# Patient Record
Sex: MALE | Race: AMERICAN INDIAN OR ALASKA NATIVE
[De-identification: names, ages, dates, MRNs, and addresses within clinical notes are randomized per-mention and may not be internally consistent; named-entity substitution may affect disease eponyms.]

---

## 2018-03-01 ENCOUNTER — HOSPITAL ENCOUNTER (EMERGENCY)
Dept: HOSPITAL 56 - MW.ED | Age: 33
Discharge: HOME | End: 2018-03-01
Payer: MEDICAID

## 2018-03-01 VITALS — SYSTOLIC BLOOD PRESSURE: 131 MMHG | DIASTOLIC BLOOD PRESSURE: 97 MMHG

## 2018-03-01 DIAGNOSIS — I10: ICD-10-CM

## 2018-03-01 DIAGNOSIS — L03.311: ICD-10-CM

## 2018-03-01 DIAGNOSIS — L02.211: Primary | ICD-10-CM

## 2018-03-01 DIAGNOSIS — F17.210: ICD-10-CM

## 2018-03-01 NOTE — EDM.PDOC
ED HPI GENERAL MEDICAL PROBLEM





- General


Chief Complaint: Skin Complaint


Stated Complaint: POSSIBLE STAPH INFECTION


Time Seen by Provider: 03/01/18 20:29


Source of Information: Reports: Patient





- History of Present Illness


INITIAL COMMENTS - FREE TEXT/NARRATIVE: 





HISTORY AND PHYSICAL:


History of present illness:


[Patient was in last week with an abscess on his abdomen he has history of 

previous staph abscesses, this abscess is just right of his umbilicus is small 

open lesion with scant exudates healing by secondary intent drained on its own 

last week however he did not fill his antibiotics actually looks to be healing 

well he could definitely use an antibiotic. The drain lesion is approximately 

three quarters of an inch in diameter healing by secondary intent with scant 

exudates red rolled border and small amount of cellulitis surrounding





No fever nausea vomiting chills sweats no chest pain shortness breath headache 

dizziness palpitation about your symptoms





]


Review of systems: 


As per history of present illness and below otherwise all systems reviewed and 

negative.


Past medical history: 


As per history of present illness and as reviewed below otherwise 

noncontributory.


Surgical history: 


As per history of present illness and as reviewed below otherwise 

noncontributory.


Social history: 


No reported history of drug or alcohol abuse.


Family history: 


As per history of present illness and as reviewed below otherwise 

noncontributory.


Physical exam:


HEENT: Atraumatic, normocephalic, pupils reactive, negative for conjunctival 

pallor or scleral icterus, mucous membranes moist, throat clear, neck supple, 

nontender, trachea midline.


Lungs: Clear to auscultation, breath sounds equal bilaterally, chest nontender.


Heart: S1S2, regular, negative for clicks, rubs, or JVD.


Abdomen: Soft, nondistended, nontender. Negative for masses or 

hepatosplenomegaly. Negative for costovertebral tenderness.


Pelvis: Stable nontender.


Genitourinary: Deferred.


Rectal: Deferred.


Extremities: Atraumatic, negative for cords or calf pain. Neurovascular 

unremarkable.


Neuro: Awake, alert, oriented. Cranial nerves II through XII unremarkable. 

Cerebellum unremarkable. Motor and sensory unremarkable throughout. Exam 

nonfocal.


Skin as per history of present illness otherwise unremarkable


Diagnostics:


[Wound culture]


Therapeutics:


[Bactrim double strength by mouth twice a day #20 no refill]


Impression: 


[Abscess with auto drainage


Mild cellulitis]


Definitive disposition and diagnosis as appropriate pending reevaluation and 

review of above.





- Related Data


 Allergies











Allergy/AdvReac Type Severity Reaction Status Date / Time


 


No Known Allergies Allergy   Verified 02/25/18 18:52











Home Meds: 


 Home Meds





. [No Known Home Meds]  08/29/16 [History]











Past Medical History





- Past Health History


Medical/Surgical History: Denies Medical/Surgical History


HEENT History: Reports: None


Cardiovascular History: Reports: Hypertension


Respiratory History: Reports: None


Gastrointestinal History: Reports: None


Genitourinary History: Reports: None


Musculoskeletal History: Reports: None


Neurological History: Reports: None


Psychiatric History: Reports: ADHD


Endocrine/Metabolic History: Reports: None


Hematologic History: Reports: None


Immunologic History: Reports: None


Oncologic (Cancer) History: Reports: None


Dermatologic History: Reports: None





- Infectious Disease History


Infectious Disease History: Reports: Chicken Pox, Hepatitis B, Other (See Below)


Other Infectious Disease History: Staph





- Past Surgical History


Head Surgeries/Procedures: Reports: None


HEENT Surgical History: Reports: None


Cardiovascular Surgical History: Reports: None


Respiratory Surgical History: Reports: None


GI Surgical History: Reports: None


Male  Surgical History: Reports: None


Endocrine Surgical History: Reports: None


Neurological Surgical History: Reports: None


Musculoskeletal Surgical History: Reports: None


Oncologic Surgical History: Reports: None


Dermatological Surgical History: Reports: None





Social & Family History





- Family History


Family Medical History: Noncontributory





- Tobacco Use


Smoking Status *Q: Current Every Day Smoker


Years of Tobacco use: 18


Packs/Tins Daily: 0.5


Second Hand Smoke Exposure: Yes





- Caffeine Use


Caffeine Use: Reports: Coffee





- Recreational Drug Use


Recreational Drug Use: No


Drug Use in Last 12 Months: Yes


Recreational Drug Type: Reports: Marijuana/Hashish


Recreational Drug Use Frequency: Rarely





ED ROS GENERAL





- Review of Systems


Review Of Systems: See Below





ED EXAM, SKIN/RASH


Exam: See Below





Course





- Orders/Labs/Meds


Orders: 





 Active Orders 24 hr











 Category Date Time Status


 


 CULTURE WOUND [RM] Stat Lab  03/01/18 20:28 Ordered














Departure





- Departure


Time of Disposition: 20:31


Disposition: Home, Self-Care 01


Condition: Good


Clinical Impression: 


 Cellulitis, Abscess








- Discharge Information


Referrals: 


PCP,None [Primary Care Provider] - 


Additional Instructions: 


Follow-up with primary care in one to 2 weeks


Return if symptoms persist or worsen


Culture is pending





The following information is given to patients seen in the emergency department 

who are being discharged to home. This information is to outline your options 

for follow-up care. We provide all patients seen in our emergency department 

with a follow-up referral.





The need for follow-up, as well as the timing and circumstances, are variable 

depending upon the specifics of your emergency department visit.





If you don't have a primary care physician on staff, we will provide you with a 

referral. We always advise you to contact your personal physician following an 

emergency department visit to inform them of the circumstance of the visit and 

for follow-up with them and/or the need for any referrals to a consulting 

specialist.





The emergency department will also refer you to a specialist when appropriate. 

This referral assures that you have the opportunity for follow-up care with a 

specialist. All of these measure are taken in an effort to provide you with 

optimal care, which includes your follow-up.





Under all circumstances we always encourage you to contact your private 

physician who remains a resource for coordinating your care. When calling for 

follow-up care, please make the office aware that this follow-up is from your 

recent emergency room visit. If for any reason you are refused follow-up, 

please contact the [facility] emergency department at [phone] 

and asked to speak to the emergency department charge nurse.








- My Orders


Last 24 Hours: 





My Active Orders





03/01/18 20:28


CULTURE WOUND [RM] Stat 














- Assessment/Plan


Last 24 Hours: 





My Active Orders





03/01/18 20:28


CULTURE WOUND [RM] Stat

## 2018-03-30 ENCOUNTER — HOSPITAL ENCOUNTER (EMERGENCY)
Dept: HOSPITAL 56 - MW.ED | Age: 33
Discharge: HOME | End: 2018-03-30
Payer: COMMERCIAL

## 2018-03-30 VITALS — DIASTOLIC BLOOD PRESSURE: 86 MMHG | SYSTOLIC BLOOD PRESSURE: 152 MMHG

## 2018-03-30 DIAGNOSIS — I10: ICD-10-CM

## 2018-03-30 DIAGNOSIS — F17.210: ICD-10-CM

## 2018-03-30 DIAGNOSIS — L02.211: Primary | ICD-10-CM

## 2018-03-30 PROCEDURE — 99283 EMERGENCY DEPT VISIT LOW MDM: CPT

## 2018-03-30 PROCEDURE — 87186 SC STD MICRODIL/AGAR DIL: CPT

## 2018-03-30 PROCEDURE — 96372 THER/PROPH/DIAG INJ SC/IM: CPT

## 2018-03-30 PROCEDURE — 87077 CULTURE AEROBIC IDENTIFY: CPT

## 2018-03-30 PROCEDURE — 87070 CULTURE OTHR SPECIMN AEROBIC: CPT

## 2018-03-30 PROCEDURE — 85025 COMPLETE CBC W/AUTO DIFF WBC: CPT

## 2018-03-30 NOTE — EDM.PDOC
ED HPI GENERAL MEDICAL PROBLEM





- General


Chief Complaint: Skin Complaint


Stated Complaint: LEFT LOWER ABDOMINAL PAIN


Time Seen by Provider: 03/30/18 09:45


Source of Information: Reports: Patient





- History of Present Illness


INITIAL COMMENTS - FREE TEXT/NARRATIVE: 





HISTORY AND PHYSICAL:


History of present illness:


[Patient presents with a superficial skin abscess on the left lower quadrant 

has history of MRSA infection in the past no fever nausea vomiting chills 

sweats he rates pain 6 out of 10 nonradiating associated with the lesion it is 

red indurated 1-1/2 inches in diameter centrally there is a fluctuant area with 

what appears to be a small amount of superficial exudate]








Review of systems: 


As per history of present illness and below otherwise all systems reviewed and 

negative.


Past medical history: 


As per history of present illness and as reviewed below otherwise 

noncontributory.


Surgical history: 


As per history of present illness and as reviewed below otherwise 

noncontributory.


Social history: 


No reported history of drug or alcohol abuse.


Family history: 


As per history of present illness and as reviewed below otherwise 

noncontributory.








Physical exam:


HEENT: Atraumatic, normocephalic, pupils reactive, negative for conjunctival 

pallor or scleral icterus, mucous membranes moist, throat clear, neck supple, 

nontender, trachea midline.


Lungs: Clear to auscultation, breath sounds equal bilaterally, chest nontender.


Heart: S1S2, regular, negative for clicks, rubs, or JVD.


Abdomen: Soft, nondistended, nontender. Negative for masses or 

hepatosplenomegaly. Negative for costovertebral tenderness.


Pelvis: Stable nontender.


Genitourinary: Deferred.


Rectal: Deferred.


Extremities: Atraumatic, negative for cords or calf pain. Neurovascular 

unremarkable.


Neuro: Awake, alert, oriented. Cranial nerves II through XII unremarkable. 

Cerebellum unremarkable. Motor and sensory unremarkable throughout. Exam 

nonfocal.


Skin as per history of present illness








Diagnostics:


[CBC


Wound culture


]


Therapeutics:


[1 g Rocephin IM


I&D performed-2-3 tablespoons of drainage were expressed, patient refused 

iodoform packing


Patient agrees to warm compress to promote drainage


Return if symptoms persist or worsen


Follow-up with primary careIn one week 





Bactrim double strength by mouth twice a day #20 no refill


]


Impression: 


[ abscess left lower quadrant ]


Definitive disposition and diagnosis as appropriate pending reevaluation and 

review of above.


  ** Left Lower Abdomen


Pain Score (Numeric/FACES): 10





- Related Data


 Allergies











Allergy/AdvReac Type Severity Reaction Status Date / Time


 


No Known Allergies Allergy   Verified 03/30/18 09:41











Home Meds: 


 Home Meds





. [No Known Home Meds]  08/29/16 [History]











Past Medical History





- Past Health History


Medical/Surgical History: Denies Medical/Surgical History


HEENT History: Reports: None


Cardiovascular History: Reports: Hypertension


Respiratory History: Reports: None


Gastrointestinal History: Reports: None


Genitourinary History: Reports: None


Musculoskeletal History: Reports: None


Neurological History: Reports: None


Psychiatric History: Reports: ADHD


Endocrine/Metabolic History: Reports: None


Hematologic History: Reports: None


Immunologic History: Reports: None


Oncologic (Cancer) History: Reports: None


Dermatologic History: Reports: None





- Infectious Disease History


Infectious Disease History: Reports: Chicken Pox, Hepatitis B, Other (See Below)


Other Infectious Disease History: Staph





- Past Surgical History


Head Surgeries/Procedures: Reports: None


HEENT Surgical History: Reports: None


Cardiovascular Surgical History: Reports: None


Respiratory Surgical History: Reports: None


GI Surgical History: Reports: None


Male  Surgical History: Reports: None


Endocrine Surgical History: Reports: None


Neurological Surgical History: Reports: None


Musculoskeletal Surgical History: Reports: None


Oncologic Surgical History: Reports: None


Dermatological Surgical History: Reports: None





Social & Family History





- Family History


Family Medical History: Noncontributory





- Tobacco Use


Smoking Status *Q: Current Every Day Smoker


Years of Tobacco use: 18


Packs/Tins Daily: 0.5


Second Hand Smoke Exposure: Yes





- Caffeine Use


Caffeine Use: Reports: Coffee





- Recreational Drug Use


Recreational Drug Use: No


Drug Use in Last 12 Months: Yes


Recreational Drug Type: Reports: Marijuana/Hashish


Recreational Drug Use Frequency: Rarely





ED ROS GENERAL





- Review of Systems


Review Of Systems: ROS reveals no pertinent complaints other than HPI.





ED EXAM, SKIN/RASH


Exam: See Below





Course





- Vital Signs


Last Recorded V/S: 


 Last Vital Signs











Temp  98.1 F   03/30/18 09:44


 


Pulse  103 H  03/30/18 09:44


 


Resp  16   03/30/18 09:44


 


BP  152/97 H  03/30/18 09:44


 


Pulse Ox  99   03/30/18 09:44














- Orders/Labs/Meds


Orders: 


 Active Orders 24 hr











 Category Date Time Status


 


 CULTURE WOUND [RM] Stat Lab  03/30/18 10:26 Ordered











Labs: 


 Laboratory Tests











  03/30/18 Range/Units





  09:55 


 


WBC  10.66  (4.0-11.0)  K/uL


 


RBC  4.99  (4.50-5.90)  M/uL


 


Hgb  14.7  (13.0-17.0)  g/dL


 


Hct  42.1  (38.0-50.0)  %


 


MCV  84.4  (80.0-98.0)  fL


 


MCH  29.5  (27.0-32.0)  pg


 


MCHC  34.9  (31.0-37.0)  g/dL


 


RDW Std Deviation  40.5  (28.0-62.0)  fl


 


RDW Coeff of [place]  13  (11.0-15.0)  %


 


Plt Count  256  (150-400)  K/uL


 


MPV  9.50  (7.40-12.00)  fL


 


Neut % (Auto)  78.0  (48.0-80.0)  %


 


Lymph % (Auto)  13.6 L  (16.0-40.0)  %


 


Mono % (Auto)  5.2  (0.0-15.0)  %


 


Eos % (Auto)  2.9  (0.0-7.0)  %


 


Baso % (Auto)  0.3  (0.0-1.5)  %


 


Neut # (Auto)  8.3 H  (1.4-5.7)  K/uL


 


Lymph # (Auto)  1.5  (0.6-2.4)  K/uL


 


Mono # (Auto)  0.6  (0.0-0.8)  K/uL


 


Eos # (Auto)  0.3  (0.0-0.7)  K/uL


 


Baso # (Auto)  0.0  (0.0-0.1)  K/uL


 


Nucleated RBC %  0.0  /100WBC


 


Nucleated RBCs #  0  K/uL











Meds: 


Medications














Discontinued Medications














Generic Name Dose Route Start Last Admin





  Trade Name Freq  PRN Reason Stop Dose Admin


 


Ceftriaxone Sodium 1,000 mg/  4 mls @ 4 mls/sec  03/30/18 10:23  





  Lidocaine HCl  IM  03/30/18 10:24  





  ONETIME ONE   





     





     





     





     


 


Lidocaine HCl  20 ml  03/30/18 09:57  03/30/18 10:19





  Xylocaine 1%  INJECT  03/30/18 09:58  20 ml





  ONETIME ONE   Administration





     





     





     





     














Departure





- Departure


Time of Disposition: 10:39


Disposition: Home, Self-Care 01


Condition: Good


Clinical Impression: 


 Abscess








- Discharge Information


Referrals: 


PCP,None [Primary Care Provider] - 


Forms:  ED Department Discharge


Additional Instructions: 


Continue with warm compresses to promote drainage at least 20 minutes 3 times 

daily


Return if symptoms persist or worsen despite treatment


Follow-up with primary care for recheck in one week





[facility] - Primary [facility]


[address] [place] [id number]


Phone: [phone]


Fax: [phone]








The following information is given to patients seen in the emergency department 

who are being discharged to home. This information is to outline your options 

for follow-up care. We provide all patients seen in our emergency department 

with a follow-up referral.





The need for follow-up, as well as the timing and circumstances, are variable 

depending upon the specifics of your emergency department visit.





If you don't have a primary care physician on staff, we will provide you with a 

referral. We always advise you to contact your personal physician following an 

emergency department visit to inform them of the circumstance of the visit and 

for follow-up with them and/or the need for any referrals to a consulting 

specialist.





The emergency department will also refer you to a specialist when appropriate. 

This referral assures that you have the opportunity for follow-up care with a 

specialist. All of these measure are taken in an effort to provide you with 

optimal care, which includes your follow-up.





Under all circumstances we always encourage you to contact your private 

physician who remains a resource for coordinating your care. When calling for 

follow-up care, please make the office aware that this follow-up is from your 

recent emergency room visit. If for any reason you are refused follow-up, 

please contact the [facility] emergency department at [phone] 

and asked to speak to the emergency department charge nurse.











- My Orders


Last 24 Hours: 


My Active Orders





03/30/18 10:26


CULTURE WOUND [RM] Stat 














- Assessment/Plan


Last 24 Hours: 


My Active Orders





03/30/18 10:26


CULTURE WOUND [RM] Stat

## 2021-07-22 ENCOUNTER — HOSPITAL ENCOUNTER (EMERGENCY)
Dept: HOSPITAL 43 - DL.ED | Age: 36
LOS: 1 days | Discharge: HOME | End: 2021-07-23
Payer: MEDICAID

## 2021-07-22 DIAGNOSIS — F15.10: Primary | ICD-10-CM

## 2021-07-22 DIAGNOSIS — I10: ICD-10-CM

## 2021-07-22 DIAGNOSIS — Z87.891: ICD-10-CM

## 2021-07-23 VITALS — SYSTOLIC BLOOD PRESSURE: 147 MMHG | HEART RATE: 63 BPM | DIASTOLIC BLOOD PRESSURE: 95 MMHG

## 2021-07-23 LAB
AMPHET UR QL SCN: POSITIVE
AMPHET UR QL SCN: POSITIVE
AMPHETAMINES UR QL SCN>500 NG/ML: NEGATIVE
ANION GAP SERPL CALC-SCNC: 14.4 MEQ/L (ref 7–13)
BARBITURATES UR QL SCN: NEGATIVE
CHLORIDE SERPL-SCNC: 101 MMOL/L (ref 98–107)
MDMA UR QL SCN: NEGATIVE
OXYCODONE UR QL SCN: NEGATIVE
PCP UR QL SCN: NEGATIVE
SODIUM SERPL-SCNC: 137 MMOL/L (ref 136–145)
SP GR UR STRIP: > 1.03 (ref 1–1.03)
TRICYCLICS UR QL SCN: NEGATIVE

## 2021-07-23 NOTE — EDM.PDOC
ED HPI GENERAL MEDICAL PROBLEM





- General


Chief Complaint: Neuro Symptoms/Deficits


Stated Complaint: CHEST PAIN


Time Seen by Provider: 07/23/21 00:05


Source of Information: Reports: Patient


History Limitations: Reports: Intoxication





- History of Present Illness


INITIAL COMMENTS - FREE TEXT/NARRATIVE: 





Recently picked up by law enforcement, c/o chest pain hallucinating with left a

rm numbness. Visialized prior on [place] presenting with officer, moving all 

extremities.  No recent trauma. Admits recent drug use





- Related Data


                                    Allergies











Allergy/AdvReac Type Severity Reaction Status Date / Time


 


No Known Allergies Allergy   Verified 07/23/21 00:09











Home Meds: 


                                    Home Meds





. [No Known Home Meds]  08/29/16 [History]











Past Medical History





- Past Health History


Medical/Surgical History: Denies Medical/Surgical History


HEENT History: Reports: None


Cardiovascular History: Reports: Hypertension


Respiratory History: Reports: None


Gastrointestinal History: Reports: None


Genitourinary History: Reports: None


Musculoskeletal History: Reports: Back Pain, Chronic


Other Musculoskeletal History: fractured [id number] 10 years ago


Neurological History: Reports: None


Psychiatric History: Reports: ADHD


Endocrine/Metabolic History: Reports: None


Hematologic History: Reports: None


Immunologic History: Reports: None


Oncologic (Cancer) History: Reports: None


Dermatologic History: Reports: None





- Infectious Disease History


Infectious Disease History: Reports: Chicken Pox, Hepatitis B, Other (See Below)


Other Infectious Disease History: Staph





- Past Surgical History


Head Surgeries/Procedures: Reports: None


HEENT Surgical History: Reports: None


Cardiovascular Surgical History: Reports: None


Respiratory Surgical History: Reports: None


GI Surgical History: Reports: None


Male  Surgical History: Reports: None


Endocrine Surgical History: Reports: None


Neurological Surgical History: Reports: None


Musculoskeletal Surgical History: Reports: None


Oncologic Surgical History: Reports: None


Dermatological Surgical History: Reports: None





Social & Family History





- Family History


Family Medical History: No Pertinent Family History





- Tobacco Use


Tobacco Use Status *Q: Former Tobacco User


Used Tobacco, but Quit: Yes


Month/Year Tobacco Last Used: 5/2021





- Caffeine Use


Caffeine Use: Reports: Coffee





- Recreational Drug Use


Recreational Drug Use: Yes


Recreational Drug Type: Reports: Methamphetamine





ED ROS GENERAL





- Review of Systems


Review Of Systems: See Below


Constitutional: Reports: Diaphoresis


HEENT: Reports: No Symptoms


Respiratory: Reports: No Symptoms


Cardiovascular: Reports: Chest Pain


GI/Abdominal: Reports: Abdominal Pain (epigastric)


: Reports: No Symptoms


Musculoskeletal: Reports: Muscle Stiffness


Skin: Reports: No Symptoms


Neurological: Reports: Numbness (left arm )


Psychiatric: Reports: Anxiety, Other (recent drug use, tacticle hallucinations).

 Denies: Suicidal Ideation





ED EXAM, NEURO





- Physical Exam


Exam: See Below


Exam Limited By: No Limitations


General Appearance: Alert, No Apparent Distress


Eye Exam: Bilateral Eye: EOMI, PERRL (5mm)


Ears: Normal External Exam, Normal Canal, Hearing Grossly Normal, Normal TMs


Nose: Normal Inspection


Throat/Mouth: Normal Inspection


Head Exam: Atraumatic, Normocephalic


Neck: Normal Inspection, Full Range of Motion


Respiratory/Chest: No Respiratory Distress, Lungs Clear, Normal Breath Sounds


Cardiovascular: Normal Peripheral Pulses, Regular Rate, Rhythm


GI/Abdominal: Normal Bowel Sounds, Soft, Non-Tender, No Distention


Neurological: Alert, Normal Dorsiflexion, No Motor/Sensory Deficits, Oriented x 

3


Back Exam: Normal Inspection, Full Range of Motion


Extremities: Normal Inspection


Psychiatric: Other (animated)


Skin Exam: Warm, Dry


  ** #1 Interpretation


EKG Date: 07/23/21


Time: 00:04


Rhythm: NSR


Rate (Beats/Min): 71


Axis: Normal


P-Wave: Present


QRS: Normal


ST-T: Normal


Comparison: NA - No Prior EKG





Course





- Vital Signs


Last Recorded V/S: 


                                Last Vital Signs











Temp  97.1 F   07/23/21 00:09


 


Pulse  63   07/23/21 00:09


 


Resp  14   07/23/21 00:09


 


BP  147/95 H  07/23/21 00:09


 


Pulse Ox  100   07/23/21 00:09














- Orders/Labs/Meds


Labs: 


                                Laboratory Tests











  07/23/21 07/23/21 07/23/21 Range/Units





  00:39 00:40 00:40 


 


WBC   9.3   (5.0-10.0)  10^3/uL


 


RBC   6.25 H   (4.6-6.2)  10^6/uL


 


Hgb   17.8   (14.0-18.0)  g/dL


 


Hct   52.1   (40.0-54.0)  %


 


MCV   83.4   ()  fL


 


MCH   28.5   (27.0-34.0)  pg


 


MCHC   34.2   (33.0-35.0)  g/dL


 


Plt Count   281   (150-450)  10^3/uL


 


Neut % (Auto)   70.9   (42.2-75.2)  %


 


Lymph % (Auto)   21.9   (20.5-50.1)  %


 


Mono % (Auto)   5.3   (2-8)  %


 


Eos % (Auto)   1.6   (1.0-3.0)  %


 


Baso % (Auto)   0.3   (0.0-1.0)  %


 


Sodium    137  (136-145)  mmol/L


 


Potassium    4.4  (3.5-5.1)  mmol/L


 


Chloride    101  ()  mmol/L


 


Carbon Dioxide    26  (21-32)  mmol/L


 


Anion Gap    14.4 H  (7-13)  mEq/L


 


BUN    19 H  (7-18)  mg/dL


 


Creatinine    1.03  (0.70-1.30)  mg/dL


 


Est Cr Clr Drug Dosing    102.37  mL/min


 


Estimated GFR (MDRD)    > 60  


 


BUN/Creatinine Ratio    18.4  (No establ ref range)  


 


Glucose    91  (70-99)  mg/dL


 


POC Glucose  91    (70-99)  mg/dL


 


Calcium    9.0  (8.5-10.1)  mg/dL


 


Total Bilirubin    0.6  (0.2-1.0)  mg/dL


 


AST    18  (15-37)  U/L


 


ALT    20  (16-63)  U/L


 


Alkaline Phosphatase    100  ()  U/L


 


Total Protein    7.7  (6.4-8.2)  g/dL


 


Albumin    4.1  (3.4-5.0)  g/dL


 


Globulin    3.6  g/dL


 


Albumin/Globulin Ratio    1.14  


 


Urine Color     (YELLOW)  


 


Urine Appearance     (CLEAR)  


 


Urine pH     (5.0-9.0)  


 


Ur Specific Gravity     (1.005-1.030)  


 


Urine Protein     (NEGATIVE)  


 


Urine Glucose (UA)     (NEGATIVE)  


 


Urine Ketones     (NEGATIVE)  


 


Urine Occult Blood     (NEGATIVE)  


 


Urine Nitrite     (NEGATIVE)  


 


Urine Bilirubin     (NEGATIVE)  


 


Urine Urobilinogen     (0.2-1.0)  mg/dL


 


Ur Leukocyte Esterase     (NEGATIVE)  


 


Urine Opiates Screen     (NEGATIVE)  


 


Ur Oxycodone Screen     (NEGATIVE)  


 


Urine Methadone Screen     (NEGATIVE)  


 


Ur Barbiturates Screen     (NEGATIVE)  


 


U Tricyclic Antidepress     (NEGATIVE)  


 


Ur Phencyclidine Scrn     (NEGATIVE)  


 


Ur Amphetamine Screen     (NEGATIVE)  


 


U Methamphetamines Scrn     (NEGATIVE)  


 


Urine MDMA Screen     (NEGATIVE)  


 


U Benzodiazepines Scrn     (NEGATIVE)  


 


Urine Cocaine Screen     (NEGATIVE)  


 


U Marijuana (THC) Screen     (NEGATIVE)  














  07/23/21 07/23/21 Range/Units





  00:58 00:58 


 


WBC    (5.0-10.0)  10^3/uL


 


RBC    (4.6-6.2)  10^6/uL


 


Hgb    (14.0-18.0)  g/dL


 


Hct    (40.0-54.0)  %


 


MCV    ()  fL


 


MCH    (27.0-34.0)  pg


 


MCHC    (33.0-35.0)  g/dL


 


Plt Count    (150-450)  10^3/uL


 


Neut % (Auto)    (42.2-75.2)  %


 


Lymph % (Auto)    (20.5-50.1)  %


 


Mono % (Auto)    (2-8)  %


 


Eos % (Auto)    (1.0-3.0)  %


 


Baso % (Auto)    (0.0-1.0)  %


 


Sodium    (136-145)  mmol/L


 


Potassium    (3.5-5.1)  mmol/L


 


Chloride    ()  mmol/L


 


Carbon Dioxide    (21-32)  mmol/L


 


Anion Gap    (7-13)  mEq/L


 


BUN    (7-18)  mg/dL


 


Creatinine    (0.70-1.30)  mg/dL


 


Est Cr Clr Drug Dosing    mL/min


 


Estimated GFR (MDRD)    


 


BUN/Creatinine Ratio    (No establ ref range)  


 


Glucose    (70-99)  mg/dL


 


POC Glucose    (70-99)  mg/dL


 


Calcium    (8.5-10.1)  mg/dL


 


Total Bilirubin    (0.2-1.0)  mg/dL


 


AST    (15-37)  U/L


 


ALT    (16-63)  U/L


 


Alkaline Phosphatase    ()  U/L


 


Total Protein    (6.4-8.2)  g/dL


 


Albumin    (3.4-5.0)  g/dL


 


Globulin    g/dL


 


Albumin/Globulin Ratio    


 


Urine Color  Yellow   (YELLOW)  


 


Urine Appearance  Clear   (CLEAR)  


 


Urine pH  6.0   (5.0-9.0)  


 


Ur Specific Gravity  >= 1.030   (1.005-1.030)  


 


Urine Protein  Negative   (NEGATIVE)  


 


Urine Glucose (UA)  Negative   (NEGATIVE)  


 


Urine Ketones  15 H   (NEGATIVE)  


 


Urine Occult Blood  Negative   (NEGATIVE)  


 


Urine Nitrite  Negative   (NEGATIVE)  


 


Urine Bilirubin  Negative   (NEGATIVE)  


 


Urine Urobilinogen  0.2   (0.2-1.0)  mg/dL


 


Ur Leukocyte Esterase  Negative   (NEGATIVE)  


 


Urine Opiates Screen   Negative  (NEGATIVE)  


 


Ur Oxycodone Screen   Negative  (NEGATIVE)  


 


Urine Methadone Screen   Negative  (NEGATIVE)  


 


Ur Barbiturates Screen   Negative  (NEGATIVE)  


 


U Tricyclic Antidepress   Negative  (NEGATIVE)  


 


Ur Phencyclidine Scrn   Negative  (NEGATIVE)  


 


Ur Amphetamine Screen   Positive H  (NEGATIVE)  


 


U Methamphetamines Scrn   Positive H  (NEGATIVE)  


 


Urine MDMA Screen   Negative  (NEGATIVE)  


 


U Benzodiazepines Scrn   Negative  (NEGATIVE)  


 


Urine Cocaine Screen   Negative  (NEGATIVE)  


 


U Marijuana (THC) Screen   Positive H  (NEGATIVE)  














Departure





- Departure


Time of Disposition: 01:28


Disposition: Home, Self-Care 01


Condition: Good


Clinical Impression: 


 Methamphetamine abuse








- Discharge Information


*PRESCRIPTION DRUG MONITORING PROGRAM REVIEWED*: No


*COPY OF PRESCRIPTION DRUG MONITORING REPORT IN PATIENT [place]: No


Instructions:  Methamphetamines Use Disorder


Referrals: 


PCP,None [Primary Care Provider] - 


Forms:  ED Department Discharge


Additional Instructions: 


rest


hydrate


consider substance abuse treatment


close watch tonight





Sepsis Event Note (ED)





- Evaluation


Sepsis Screening Result: No Definite Risk

## 2021-07-23 NOTE — CT
PROCEDURE INFORMATION: 

Exam: CT Head Without Contrast 

Exam date and time: 7/23/2021 12:45 AM 

Age: 36 years old 

Clinical indication: Weakness, extremity and weakness, facial; Right; 

Additional info: Chest pain, right arm weakness 



TECHNIQUE: 

Imaging protocol: Computed tomography of the head without contrast. Coronal and 

sagittal reformatted images are submitted.  

Radiation optimization: All CT scans at this facility use at least one of these 

dose optimization techniques: automated exposure control; mA and/or kV 

adjustment per patient size (includes targeted exams where dose is matched to 

clinical indication); or iterative reconstruction. 

Other technique: STROKE PROTOCOL was implemented. 



COMPARISON: No prior study is available for comparison at the time of this 

interpretation.



FINDINGS: 

No abnormal foci of altered attenuation within the cerebral or cerebellar 

parenchyma.  

No intracranial mass lesion or evidence for acute territorial ischemia.

The ventricles and sulci are within normal limits for age without midline shift 

or hydrocephalus. 

No abnormal extraaxial fluid or air collection; no intracranial hemorrhage. 

There is an old nasal fracture deformity.

There is a 1 cm anterior right sphenoid sinus mucous retention cyst or polyp.  

The remaining visualized paranasal sinuses, orbits, mastoid air cells, skull, 

and scalp are unremarkable.



IMPRESSION:  

Intracranially normal noncontrast head CT scan.



ASSESSMENT: 

[place] Stroke Program Early CT Score (ASPECTS) = 10.